# Patient Record
Sex: MALE | URBAN - NONMETROPOLITAN AREA
[De-identification: names, ages, dates, MRNs, and addresses within clinical notes are randomized per-mention and may not be internally consistent; named-entity substitution may affect disease eponyms.]

---

## 2022-04-07 ENCOUNTER — TELEPHONE (OUTPATIENT)
Dept: SURGERY | Facility: CLINIC | Age: 57
End: 2022-04-07

## 2022-04-19 NOTE — TELEPHONE ENCOUNTER
Attempted to contact patient to schedule for an open access colonoscopy pending a call back at this time.  
Attempted to contact patient to schedule for colonoscopy unable to reach patient letter has been mailed at this time.   
Contacted patient to schedule for a colonoscopy, patient states that he is not feeling well and would not like to schedule at this time.  Patient states that he would call our office back.   
Pt has a referral to Dr. Crabtree for a screening colonoscopy.  
no